# Patient Record
Sex: FEMALE | Race: WHITE | Employment: FULL TIME | URBAN - METROPOLITAN AREA
[De-identification: names, ages, dates, MRNs, and addresses within clinical notes are randomized per-mention and may not be internally consistent; named-entity substitution may affect disease eponyms.]

---

## 2017-01-06 PROCEDURE — 87591 N.GONORRHOEAE DNA AMP PROB: CPT | Performed by: OBSTETRICS & GYNECOLOGY

## 2017-01-06 PROCEDURE — 87491 CHLMYD TRACH DNA AMP PROBE: CPT | Performed by: OBSTETRICS & GYNECOLOGY

## 2017-01-06 PROCEDURE — 88175 CYTOPATH C/V AUTO FLUID REDO: CPT | Performed by: OBSTETRICS & GYNECOLOGY

## 2017-12-06 ENCOUNTER — HOSPITAL ENCOUNTER (EMERGENCY)
Age: 22
Discharge: HOME | End: 2017-12-06
Attending: EMERGENCY MEDICINE
Payer: COMMERCIAL

## 2017-12-06 VITALS
TEMPERATURE: 98 F | RESPIRATION RATE: 18 BRPM | OXYGEN SATURATION: 99 % | DIASTOLIC BLOOD PRESSURE: 51 MMHG | HEIGHT: 61 IN | SYSTOLIC BLOOD PRESSURE: 100 MMHG | HEART RATE: 88 BPM | WEIGHT: 110 LBS | BODY MASS INDEX: 20.77 KG/M2

## 2017-12-06 DIAGNOSIS — E86.0 DEHYDRATION: ICD-10-CM

## 2017-12-06 DIAGNOSIS — R11.2 SEVERE NAUSEA AND VOMITING: Primary | ICD-10-CM

## 2017-12-06 PROCEDURE — 96374 THER/PROPH/DIAG INJ IV PUSH: CPT

## 2017-12-06 PROCEDURE — 99284 EMERGENCY DEPT VISIT MOD MDM: CPT

## 2017-12-06 PROCEDURE — 85025 COMPLETE CBC W/AUTO DIFF WBC: CPT | Performed by: EMERGENCY MEDICINE

## 2017-12-06 PROCEDURE — 96375 TX/PRO/DX INJ NEW DRUG ADDON: CPT

## 2017-12-06 PROCEDURE — 81025 URINE PREGNANCY TEST: CPT

## 2017-12-06 PROCEDURE — 96361 HYDRATE IV INFUSION ADD-ON: CPT

## 2017-12-06 PROCEDURE — 83690 ASSAY OF LIPASE: CPT | Performed by: EMERGENCY MEDICINE

## 2017-12-06 PROCEDURE — 81003 URINALYSIS AUTO W/O SCOPE: CPT | Performed by: EMERGENCY MEDICINE

## 2017-12-06 PROCEDURE — 80053 COMPREHEN METABOLIC PANEL: CPT | Performed by: EMERGENCY MEDICINE

## 2017-12-06 RX ORDER — MORPHINE SULFATE 2 MG/ML
2 INJECTION, SOLUTION INTRAMUSCULAR; INTRAVENOUS ONCE
Status: COMPLETED | OUTPATIENT
Start: 2017-12-06 | End: 2017-12-06

## 2017-12-06 RX ORDER — ONDANSETRON 8 MG/1
8 TABLET, ORALLY DISINTEGRATING ORAL EVERY 8 HOURS PRN
Qty: 10 TABLET | Refills: 0 | Status: SHIPPED | OUTPATIENT
Start: 2017-12-06 | End: 2017-12-13

## 2017-12-06 RX ORDER — ONDANSETRON 2 MG/ML
4 INJECTION INTRAMUSCULAR; INTRAVENOUS ONCE
Status: COMPLETED | OUTPATIENT
Start: 2017-12-06 | End: 2017-12-06

## 2017-12-07 NOTE — ED PROVIDER NOTES
Patient Seen in: [facility] In [place]    History   Patient presents with:  Abdomen/Flank Pain (GI/)  Nausea/Vomiting/Diarrhea (gastrointestinal)    Stated Complaint: ABD PAIN/VOMITING    HPI    After eating fast food at about 8 PM URINALYSIS WITH CULTURE REFLEX - Abnormal; Notable for the following:     Ketones Urine Trace (*)     All other components within normal limits   CBC W/ DIFFERENTIAL - Abnormal; Notable for the following:     WBC 13.6 (*)     Neutrophil Absolute Prelim 1

## 2019-02-15 PROCEDURE — 86003 ALLG SPEC IGE CRUDE XTRC EA: CPT | Performed by: FAMILY MEDICINE

## 2019-02-15 PROCEDURE — 82785 ASSAY OF IGE: CPT | Performed by: FAMILY MEDICINE

## 2019-02-15 PROCEDURE — 36415 COLL VENOUS BLD VENIPUNCTURE: CPT | Performed by: FAMILY MEDICINE

## 2019-12-30 ENCOUNTER — HOSPITAL ENCOUNTER (EMERGENCY)
Facility: HOSPITAL | Age: 24
Discharge: LEFT BEFORE DISCHARGE | End: 2019-12-30
Payer: COMMERCIAL

## 2019-12-30 ENCOUNTER — HOSPITAL ENCOUNTER (EMERGENCY)
Age: 24
Discharge: HOME | End: 2019-12-31
Attending: EMERGENCY MEDICINE
Payer: COMMERCIAL

## 2019-12-30 ENCOUNTER — APPOINTMENT (OUTPATIENT)
Dept: GENERAL RADIOLOGY | Age: 24
End: 2019-12-30
Attending: FAMILY MEDICINE
Payer: COMMERCIAL

## 2019-12-30 ENCOUNTER — HOSPITAL ENCOUNTER (OUTPATIENT)
Age: 24
Discharge: ANOTHER HOSPITAL | End: 2019-12-30
Attending: FAMILY MEDICINE
Payer: COMMERCIAL

## 2019-12-30 VITALS
OXYGEN SATURATION: 98 % | RESPIRATION RATE: 20 BRPM | WEIGHT: 114 LBS | DIASTOLIC BLOOD PRESSURE: 72 MMHG | TEMPERATURE: 99 F | HEART RATE: 88 BPM | BODY MASS INDEX: 21.52 KG/M2 | SYSTOLIC BLOOD PRESSURE: 110 MMHG | HEIGHT: 61 IN

## 2019-12-30 VITALS
SYSTOLIC BLOOD PRESSURE: 117 MMHG | HEART RATE: 89 BPM | OXYGEN SATURATION: 100 % | DIASTOLIC BLOOD PRESSURE: 79 MMHG | TEMPERATURE: 97 F | RESPIRATION RATE: 16 BRPM

## 2019-12-30 DIAGNOSIS — J10.1 INFLUENZA A: Primary | ICD-10-CM

## 2019-12-30 DIAGNOSIS — D72.819 LEUKOPENIA, UNSPECIFIED TYPE: ICD-10-CM

## 2019-12-30 DIAGNOSIS — E87.6 HYPOKALEMIA: ICD-10-CM

## 2019-12-30 DIAGNOSIS — R11.2 NAUSEA AND VOMITING, INTRACTABILITY OF VOMITING NOT SPECIFIED, UNSPECIFIED VOMITING TYPE: ICD-10-CM

## 2019-12-30 DIAGNOSIS — J11.1 INFLUENZA: Primary | ICD-10-CM

## 2019-12-30 DIAGNOSIS — R10.13 ACUTE EPIGASTRIC PAIN: ICD-10-CM

## 2019-12-30 DIAGNOSIS — D69.6 THROMBOCYTOPENIA (HCC): ICD-10-CM

## 2019-12-30 LAB
#MXD IC: 0.3 X10ˆ3/UL (ref 0.1–1)
ALBUMIN SERPL-MCNC: 2.7 G/DL (ref 3.4–5)
ALBUMIN/GLOB SERPL: 0.8 {RATIO} (ref 1–2)
ALP LIVER SERPL-CCNC: 30 U/L (ref 37–98)
ALT SERPL-CCNC: 26 U/L (ref 13–56)
ANION GAP SERPL CALC-SCNC: 8 MMOL/L (ref 0–18)
AST SERPL-CCNC: 40 U/L (ref 15–37)
BASOPHILS # BLD AUTO: 0.01 X10(3) UL (ref 0–0.2)
BASOPHILS NFR BLD AUTO: 0.4 %
BILIRUB SERPL-MCNC: 0.3 MG/DL (ref 0.1–2)
BUN BLD-MCNC: 9 MG/DL (ref 7–18)
BUN/CREAT SERPL: 15.3 (ref 10–20)
CALCIUM BLD-MCNC: 7.5 MG/DL (ref 8.5–10.1)
CHLORIDE SERPL-SCNC: 111 MMOL/L (ref 98–112)
CO2 SERPL-SCNC: 21 MMOL/L (ref 21–32)
CREAT BLD-MCNC: 0.59 MG/DL (ref 0.55–1.02)
CREAT BLD-MCNC: 0.6 MG/DL (ref 0.55–1.02)
DEPRECATED RDW RBC AUTO: 43.5 FL (ref 35.1–46.3)
EOSINOPHIL # BLD AUTO: 0.01 X10(3) UL (ref 0–0.7)
EOSINOPHIL NFR BLD AUTO: 0.4 %
ERYTHROCYTE [DISTWIDTH] IN BLOOD BY AUTOMATED COUNT: 13.2 % (ref 11–15)
GLOBULIN PLAS-MCNC: 3.3 G/DL (ref 2.8–4.4)
GLUCOSE BLD-MCNC: 73 MG/DL (ref 70–99)
GLUCOSE BLD-MCNC: 77 MG/DL (ref 70–99)
HCT VFR BLD AUTO: 34.8 % (ref 35–48)
HCT VFR BLD AUTO: 40.6 % (ref 35–48)
HGB BLD-MCNC: 11.5 G/DL (ref 12–16)
HGB BLD-MCNC: 13.4 G/DL (ref 12–16)
IMM GRANULOCYTES # BLD AUTO: 0.01 X10(3) UL (ref 0–1)
IMM GRANULOCYTES NFR BLD: 0.4 %
ISTAT BUN: 8 MG/DL (ref 8–20)
ISTAT CHLORIDE: 103 MMOL/L (ref 101–111)
ISTAT HEMATOCRIT: 40 % (ref 34–50)
ISTAT IONIZED CALCIUM FOR CHEM 8: 1.17 MMOL/L (ref 1.12–1.32)
ISTAT POTASSIUM: 3.1 MMOL/L (ref 3.6–5.1)
ISTAT SODIUM: 138 MMOL/L (ref 136–145)
LIPASE SERPL-CCNC: 288 U/L (ref 73–393)
LYMPHOCYTES # BLD AUTO: 0.55 X10(3) UL (ref 1–4)
LYMPHOCYTES # BLD AUTO: 0.8 X10ˆ3/UL (ref 1–4)
LYMPHOCYTES NFR BLD AUTO: 23.4 %
LYMPHOCYTES NFR BLD AUTO: 27.1 %
M PROTEIN MFR SERPL ELPH: 6 G/DL (ref 6.4–8.2)
MCH RBC QN AUTO: 29.4 PG (ref 26–34)
MCH RBC QN AUTO: 29.6 PG (ref 26–34)
MCHC RBC AUTO-ENTMCNC: 33 G/DL (ref 31–37)
MCHC RBC AUTO-ENTMCNC: 33 G/DL (ref 31–37)
MCV RBC AUTO: 89 FL (ref 80–100)
MCV RBC AUTO: 89.5 FL (ref 80–100)
MIXED CELL %: 12 %
MONOCYTES # BLD AUTO: 0.32 X10(3) UL (ref 0.1–1)
MONOCYTES NFR BLD AUTO: 13.6 %
NEUTROPHILS # BLD AUTO: 1.45 X10 (3) UL (ref 1.5–7.7)
NEUTROPHILS # BLD AUTO: 1.45 X10(3) UL (ref 1.5–7.7)
NEUTROPHILS # BLD AUTO: 1.7 X10ˆ3/UL (ref 1.5–7.7)
NEUTROPHILS NFR BLD AUTO: 60.9 %
NEUTROPHILS NFR BLD AUTO: 61.8 %
OSMOLALITY SERPL CALC.SUM OF ELEC: 287 MOSM/KG (ref 275–295)
PLATELET # BLD AUTO: 128 10(3)UL (ref 150–450)
PLATELET # BLD AUTO: 169 X10ˆ3/UL (ref 150–450)
POCT BILIRUBIN URINE: NEGATIVE
POCT BLOOD URINE: NEGATIVE
POCT GLUCOSE URINE: NEGATIVE MG/DL
POCT INFLUENZA A: POSITIVE
POCT INFLUENZA B: NEGATIVE
POCT KETONE URINE: NEGATIVE MG/DL
POCT LEUKOCYTE ESTERASE URINE: NEGATIVE
POCT NITRITE URINE: NEGATIVE
POCT PH URINE: 7 (ref 5–8)
POCT PROTEIN URINE: NEGATIVE MG/DL
POCT SPECIFIC GRAVITY URINE: 1.02
POCT URINE COLOR: YELLOW
POCT URINE PREGNANCY: NEGATIVE
POCT UROBILINOGEN URINE: 0.2 MG/DL
POTASSIUM SERPL-SCNC: 4.6 MMOL/L (ref 3.5–5.1)
RBC # BLD AUTO: 3.89 X10(6)UL (ref 3.8–5.3)
RBC # BLD AUTO: 4.56 X10ˆ6/UL (ref 3.8–5.3)
SODIUM SERPL-SCNC: 140 MMOL/L (ref 136–145)
WBC # BLD AUTO: 2.4 X10(3) UL (ref 4–11)
WBC # BLD AUTO: 2.8 X10ˆ3/UL (ref 4–11)

## 2019-12-30 PROCEDURE — 99284 EMERGENCY DEPT VISIT MOD MDM: CPT | Performed by: EMERGENCY MEDICINE

## 2019-12-30 PROCEDURE — 81025 URINE PREGNANCY TEST: CPT | Performed by: FAMILY MEDICINE

## 2019-12-30 PROCEDURE — 71046 X-RAY EXAM CHEST 2 VIEWS: CPT | Performed by: FAMILY MEDICINE

## 2019-12-30 PROCEDURE — 96361 HYDRATE IV INFUSION ADD-ON: CPT

## 2019-12-30 PROCEDURE — 99204 OFFICE O/P NEW MOD 45 MIN: CPT

## 2019-12-30 PROCEDURE — 96374 THER/PROPH/DIAG INJ IV PUSH: CPT | Performed by: EMERGENCY MEDICINE

## 2019-12-30 PROCEDURE — 96361 HYDRATE IV INFUSION ADD-ON: CPT | Performed by: EMERGENCY MEDICINE

## 2019-12-30 PROCEDURE — 85025 COMPLETE CBC W/AUTO DIFF WBC: CPT | Performed by: FAMILY MEDICINE

## 2019-12-30 PROCEDURE — 80047 BASIC METABLC PNL IONIZED CA: CPT

## 2019-12-30 PROCEDURE — 96376 TX/PRO/DX INJ SAME DRUG ADON: CPT

## 2019-12-30 PROCEDURE — 83690 ASSAY OF LIPASE: CPT | Performed by: EMERGENCY MEDICINE

## 2019-12-30 PROCEDURE — 81025 URINE PREGNANCY TEST: CPT

## 2019-12-30 PROCEDURE — 87502 INFLUENZA DNA AMP PROBE: CPT | Performed by: FAMILY MEDICINE

## 2019-12-30 PROCEDURE — 80053 COMPREHEN METABOLIC PANEL: CPT | Performed by: EMERGENCY MEDICINE

## 2019-12-30 PROCEDURE — 99215 OFFICE O/P EST HI 40 MIN: CPT

## 2019-12-30 PROCEDURE — S0028 INJECTION, FAMOTIDINE, 20 MG: HCPCS

## 2019-12-30 PROCEDURE — 81002 URINALYSIS NONAUTO W/O SCOPE: CPT | Performed by: FAMILY MEDICINE

## 2019-12-30 PROCEDURE — 96375 TX/PRO/DX INJ NEW DRUG ADDON: CPT

## 2019-12-30 PROCEDURE — 81002 URINALYSIS NONAUTO W/O SCOPE: CPT

## 2019-12-30 PROCEDURE — 96374 THER/PROPH/DIAG INJ IV PUSH: CPT

## 2019-12-30 PROCEDURE — 96375 TX/PRO/DX INJ NEW DRUG ADDON: CPT | Performed by: EMERGENCY MEDICINE

## 2019-12-30 PROCEDURE — S0028 INJECTION, FAMOTIDINE, 20 MG: HCPCS | Performed by: EMERGENCY MEDICINE

## 2019-12-30 RX ORDER — DIPHENHYDRAMINE HYDROCHLORIDE 50 MG/ML
25 INJECTION INTRAMUSCULAR; INTRAVENOUS ONCE
Status: COMPLETED | OUTPATIENT
Start: 2019-12-30 | End: 2019-12-30

## 2019-12-30 RX ORDER — ONDANSETRON 2 MG/ML
4 INJECTION INTRAMUSCULAR; INTRAVENOUS ONCE
Status: COMPLETED | OUTPATIENT
Start: 2019-12-30 | End: 2019-12-30

## 2019-12-30 RX ORDER — FAMOTIDINE 10 MG/ML
20 INJECTION, SOLUTION INTRAVENOUS ONCE
Status: COMPLETED | OUTPATIENT
Start: 2019-12-30 | End: 2019-12-30

## 2019-12-30 RX ORDER — POTASSIUM CHLORIDE 20 MEQ/1
20 TABLET, EXTENDED RELEASE ORAL DAILY
Qty: 2 TABLET | Refills: 0 | Status: SHIPPED | OUTPATIENT
Start: 2019-12-30 | End: 2020-01-01

## 2019-12-30 RX ORDER — METOCLOPRAMIDE HYDROCHLORIDE 5 MG/ML
5 INJECTION INTRAMUSCULAR; INTRAVENOUS ONCE
Status: COMPLETED | OUTPATIENT
Start: 2019-12-30 | End: 2019-12-30

## 2019-12-30 RX ORDER — POTASSIUM CHLORIDE 20 MEQ/1
20 TABLET, EXTENDED RELEASE ORAL ONCE
Status: COMPLETED | OUTPATIENT
Start: 2019-12-30 | End: 2019-12-30

## 2019-12-30 RX ORDER — SODIUM CHLORIDE 9 MG/ML
1000 INJECTION, SOLUTION INTRAVENOUS ONCE
Status: COMPLETED | OUTPATIENT
Start: 2019-12-30 | End: 2019-12-30

## 2019-12-30 RX ORDER — ONDANSETRON 4 MG/1
4 TABLET, ORALLY DISINTEGRATING ORAL EVERY 8 HOURS PRN
Qty: 21 TABLET | Refills: 0 | Status: SHIPPED | OUTPATIENT
Start: 2019-12-30 | End: 2020-01-06

## 2019-12-31 VITALS
TEMPERATURE: 97 F | DIASTOLIC BLOOD PRESSURE: 66 MMHG | BODY MASS INDEX: 22 KG/M2 | OXYGEN SATURATION: 100 % | SYSTOLIC BLOOD PRESSURE: 111 MMHG | HEART RATE: 75 BPM | WEIGHT: 114 LBS | RESPIRATION RATE: 16 BRPM

## 2019-12-31 RX ORDER — ONDANSETRON 4 MG/1
4 TABLET, ORALLY DISINTEGRATING ORAL EVERY 4 HOURS PRN
Qty: 10 TABLET | Refills: 0 | Status: SHIPPED | OUTPATIENT
Start: 2019-12-31 | End: 2020-11-16

## 2019-12-31 NOTE — ED PROVIDER NOTES
Patient Seen in: [facility] In [place]      History   Patient presents with:  Nausea/Vomiting/Diarrhea    Stated Complaint: VOMITING, SENT FROM [facility] FOR ABD US.  +FLU    HPI    This is a 26-year-old female that presents with vomiting, c Lungs: Clear to auscultation bilaterally with no rales, no retractions, and no wheezing. HEART:  Regular rate and rhythm. S1 and S2. No murmurs, no rubs or gallops. ABDOMEN: Soft, nontender and nondistended. Normoactive bowel sounds. No rebound.  No gu rechecked in a couple of days by her primary care physician. Return anytime if she feels worse. In addition recommend she stop taking clindamycin. Patient was discharged home in good condition with her mother.           Disposition and Plan     [facility]

## 2019-12-31 NOTE — ED INITIAL ASSESSMENT (HPI)
Pt here w/ fever 102 at highest Friday and Saturday, HA, abd/gastric pain, nausea and vomiting, body aches.

## 2019-12-31 NOTE — ED PROVIDER NOTES
Patient Seen in: [facility] In [facility]      History   Patient presents with:  Fever  Abdomen/Flank Pain  Nausea/Vomiting/Diarrhea    Stated Complaint: FEVER    HPI    21-year-old female with chief complaints of cough, epigastric abdominal auscultation bilaterally  Heart S1-S2 heard no murmurs no gallops  Skin shows no rash  Abdomen is soft, minimal epigastric tenderness otherwise rest of the abdomen is soft nontender no guarding no rigidity. No CVA tenderness.     ED Course     Labs Reviewe pm    Follow-up:  [address]   [phone]  Go today          Medications Prescribed:  Discharge Medication List as of 12/30/2019  9:14 PM    START taking these medications    Potassium Chloride ER 2

## 2019-12-31 NOTE — ED INITIAL ASSESSMENT (HPI)
Cough, generalized weakness. Fever -resolved. Vomiting onset Thursday . Sent from [facility] +flu .  Sent to ED for US abd.

## 2019-12-31 NOTE — ED INITIAL ASSESSMENT (HPI)
Pt to ED from [facility] for continued nausea and vomiting. Pt diagnosed with flu, given fluids, zofran and pepcid and immediate care but pt felt no improvement of symptoms and continued to vomit.

## 2020-10-10 ENCOUNTER — APPOINTMENT (OUTPATIENT)
Dept: GENERAL RADIOLOGY | Age: 25
End: 2020-10-10
Attending: NURSE PRACTITIONER
Payer: COMMERCIAL

## 2020-10-10 ENCOUNTER — HOSPITAL ENCOUNTER (OUTPATIENT)
Age: 25
Discharge: HOME | End: 2020-10-10
Payer: COMMERCIAL

## 2020-10-10 ENCOUNTER — TELEPHONE (OUTPATIENT)
Dept: FAMILY MEDICINE CLINIC | Facility: CLINIC | Age: 25
End: 2020-10-10

## 2020-10-10 VITALS
BODY MASS INDEX: 22.66 KG/M2 | HEIGHT: 61 IN | DIASTOLIC BLOOD PRESSURE: 77 MMHG | TEMPERATURE: 99 F | WEIGHT: 120 LBS | HEART RATE: 72 BPM | SYSTOLIC BLOOD PRESSURE: 119 MMHG | RESPIRATION RATE: 18 BRPM | OXYGEN SATURATION: 100 %

## 2020-10-10 DIAGNOSIS — M54.9 MUSCULOSKELETAL BACK PAIN: Primary | ICD-10-CM

## 2020-10-10 DIAGNOSIS — Z02.9 ENCOUNTERS FOR ADMINISTRATIVE PURPOSE: Primary | ICD-10-CM

## 2020-10-10 PROCEDURE — 36415 COLL VENOUS BLD VENIPUNCTURE: CPT

## 2020-10-10 PROCEDURE — 99214 OFFICE O/P EST MOD 30 MIN: CPT

## 2020-10-10 PROCEDURE — 71046 X-RAY EXAM CHEST 2 VIEWS: CPT | Performed by: NURSE PRACTITIONER

## 2020-10-10 PROCEDURE — 85378 FIBRIN DEGRADE SEMIQUANT: CPT | Performed by: NURSE PRACTITIONER

## 2020-10-10 PROCEDURE — 81025 URINE PREGNANCY TEST: CPT | Performed by: NURSE PRACTITIONER

## 2020-10-10 RX ORDER — CYCLOBENZAPRINE HCL 10 MG
10 TABLET ORAL 3 TIMES DAILY PRN
Qty: 20 TABLET | Refills: 0 | Status: SHIPPED | OUTPATIENT
Start: 2020-10-10 | End: 2020-10-17

## 2020-10-10 NOTE — ED PROVIDER NOTES
Patient Seen in: [facility] In [place] [employer]      History   Patient presents with:  Back Pain: My middle right back hurts when I breathe in. This has been happening since Tuesday, October 6th.  - Entered by patient    Stated Complaint: Back Pain ED Triage Vitals [10/10/20 1111]   /77   Pulse 72   Resp 18   Temp 98.8 °F (37.1 °C)   Temp src Temporal   SpO2 100 %   O2 Device None (Room air)       Current:/77   Pulse 72   Temp 98.8 °F (37.1 °C) (Temporal)   Resp 18   Ht 154.9 cm (5' 1\") PROCEDURE:  XR CHEST PA + LAT CHEST (CPT=71046)  INDICATIONS:  Back Pain - My middle right back hurts when I breathe in. This has been happening since Tuesday, October 6th.   COMPARISON:  JOHANNY QUARLES, XR CHEST PA + LAT CHEST (CPT=71046), 12/30/2019, 7:1

## 2020-10-10 NOTE — TELEPHONE ENCOUNTER
Pt pre registered for [facility] visit this afternoon. CC back pain reproduced with cough and deep breathing. Pt denies CP or SOB. Referred to facility with ability to evaluate patient with imaging if it is indicated.  Pt will plan to seek care at [facility] later tod

## 2020-10-10 NOTE — ED INITIAL ASSESSMENT (HPI)
Pt began 5 days ago with upper rt back pain when she takes a deep breath.   Is a runner, denies any injury , no SOB

## 2020-11-16 ENCOUNTER — OFFICE VISIT (OUTPATIENT)
Dept: INTERNAL MEDICINE CLINIC | Facility: CLINIC | Age: 25
End: 2020-11-16

## 2020-11-16 VITALS
RESPIRATION RATE: 16 BRPM | WEIGHT: 124 LBS | HEIGHT: 61 IN | SYSTOLIC BLOOD PRESSURE: 114 MMHG | OXYGEN SATURATION: 99 % | HEART RATE: 92 BPM | BODY MASS INDEX: 23.41 KG/M2 | DIASTOLIC BLOOD PRESSURE: 70 MMHG | TEMPERATURE: 98 F

## 2020-11-16 DIAGNOSIS — Z23 NEED FOR INFLUENZA VACCINATION: Primary | ICD-10-CM

## 2020-11-16 DIAGNOSIS — Z01.89 ENCOUNTER FOR ROUTINE LABORATORY TESTING: ICD-10-CM

## 2020-11-16 DIAGNOSIS — G44.209 TENSION HEADACHE: ICD-10-CM

## 2020-11-16 DIAGNOSIS — Z00.00 ROUTINE PHYSICAL EXAMINATION: ICD-10-CM

## 2020-11-16 DIAGNOSIS — Z11.3 ROUTINE SCREENING FOR STI (SEXUALLY TRANSMITTED INFECTION): ICD-10-CM

## 2020-11-16 DIAGNOSIS — Z30.41 ORAL CONTRACEPTIVE PILL SURVEILLANCE: ICD-10-CM

## 2020-11-16 PROCEDURE — 3074F SYST BP LT 130 MM HG: CPT | Performed by: PHYSICIAN ASSISTANT

## 2020-11-16 PROCEDURE — 3078F DIAST BP <80 MM HG: CPT | Performed by: PHYSICIAN ASSISTANT

## 2020-11-16 PROCEDURE — 3008F BODY MASS INDEX DOCD: CPT | Performed by: PHYSICIAN ASSISTANT

## 2020-11-16 PROCEDURE — 90471 IMMUNIZATION ADMIN: CPT | Performed by: PHYSICIAN ASSISTANT

## 2020-11-16 PROCEDURE — 99385 PREV VISIT NEW AGE 18-39: CPT | Performed by: PHYSICIAN ASSISTANT

## 2020-11-16 PROCEDURE — 90686 IIV4 VACC NO PRSV 0.5 ML IM: CPT | Performed by: PHYSICIAN ASSISTANT

## 2020-11-16 RX ORDER — NORETHINDRONE ACETATE AND ETHINYL ESTRADIOL 1MG-20(21)
1 KIT ORAL DAILY
Qty: 3 PACKAGE | Refills: 3 | Status: SHIPPED | OUTPATIENT
Start: 2020-11-16 | End: 2021-11-02

## 2020-11-16 NOTE — PATIENT INSTRUCTIONS
Continue healthy lifestyle  Have labs drawn, fasting 8-10 hours prior (water before is ok). Call or send us a message if headaches continue or if any new symptoms arise. Otherwise drink plenty of water and take advil or tylenol if needed.

## 2020-11-16 NOTE — PROGRESS NOTES
Wellness Exam    CC: Patient is presenting for a wellness exam    HPI:   Concerns: bl frontal headache last week, better now. Was throbbing, dull, non-radiating. Sore throat for a day then resolved. Feels well today.      Pertinent Family History:   Family vomiting, abdominal pain and diarrhea. Genitourinary: Negative for urgency, frequency of urination, and abnormal vaginal bleeding. Musculoskeletal: Negative for arthralgias and gait problem. Skin: Negative for color change and rash.    Neurological: N follows:     Headache seems c/w tension-type, possibly related to virus or allergies last week, symptoms resolved now.  Pt will call if HA's return or any new symptoms    Check screening labs  Continue healthy lifestyle  Flu shot done      Discussed use of

## 2021-11-02 DIAGNOSIS — Z30.41 ORAL CONTRACEPTIVE PILL SURVEILLANCE: ICD-10-CM

## 2021-11-02 RX ORDER — NORETHINDRONE ACETATE AND ETHINYL ESTRADIOL AND FERROUS FUMARATE 1MG-20(21)
KIT ORAL
Qty: 84 TABLET | Refills: 3 | Status: SHIPPED | OUTPATIENT
Start: 2021-11-02

## 2022-10-18 DIAGNOSIS — Z30.41 ORAL CONTRACEPTIVE PILL SURVEILLANCE: ICD-10-CM

## 2022-10-18 NOTE — TELEPHONE ENCOUNTER
Last time medication was refilled 11/02/21  Quantity and # of refills 84/3  Last OV 11/16/20  Next OV No apt scheduled    Pt to RTC for physical around 11/16/21    Called pt and left detailed VM per protocol. Pt last seen in the office in 2020. No apt has been scheduled. Pt will need to schedule physical in order to obtain future refills. Ad[facility]

## 2022-10-19 RX ORDER — NORETHINDRONE ACETATE AND ETHINYL ESTRADIOL AND FERROUS FUMARATE 1MG-20(21)
KIT ORAL
Qty: 84 TABLET | Refills: 0 | Status: SHIPPED | OUTPATIENT
Start: 2022-10-19

## 2022-10-19 NOTE — TELEPHONE ENCOUNTER
Called patient. Patient states insurance changed and now has [address]. Advised patient due for OV. Patient verbalized ok with scheduling. Physical scheduled. Per patient she can only attend an appointment after 4:30.      Future Appointments   Date Time Provider Henry Guthrie   10/26/2022  5:00 PM MIRE[facility]A [facility] [address] [facility] [address]

## 2023-01-26 DIAGNOSIS — Z30.41 ORAL CONTRACEPTIVE PILL SURVEILLANCE: ICD-10-CM

## 2023-01-26 RX ORDER — NORETHINDRONE ACETATE AND ETHINYL ESTRADIOL AND FERROUS FUMARATE 1MG-20(21)
KIT ORAL
Qty: 28 TABLET | Refills: 0 | Status: SHIPPED | OUTPATIENT
Start: 2023-01-26

## 2023-02-22 DIAGNOSIS — Z30.41 ORAL CONTRACEPTIVE PILL SURVEILLANCE: ICD-10-CM

## 2023-02-22 NOTE — TELEPHONE ENCOUNTER
JUNEL FE 1/20 1-20 MG-MCG Oral Tab  Last time medication was refilled 1/26/23  Quantity and # of refills 28 tab  Last OV 11/16/2020  Next OV LVM notifying patient to schedule OV prior to refills moving forward.

## 2023-02-24 DIAGNOSIS — Z30.41 ORAL CONTRACEPTIVE PILL SURVEILLANCE: ICD-10-CM

## 2023-02-24 RX ORDER — NORETHINDRONE ACETATE AND ETHINYL ESTRADIOL 1MG-20(21)
1 KIT ORAL DAILY
Qty: 28 TABLET | Refills: 0 | Status: CANCELLED | OUTPATIENT
Start: 2023-02-24

## 2023-02-24 RX ORDER — NORETHINDRONE ACETATE AND ETHINYL ESTRADIOL AND FERROUS FUMARATE 1MG-20(21)
KIT ORAL
Qty: 28 TABLET | Refills: 0 | OUTPATIENT
Start: 2023-02-24

## 2024-01-23 ENCOUNTER — OFFICE VISIT (OUTPATIENT)
Facility: CLINIC | Age: 29
End: 2024-01-23
Payer: COMMERCIAL

## 2024-01-23 VITALS
DIASTOLIC BLOOD PRESSURE: 70 MMHG | WEIGHT: 118.63 LBS | HEIGHT: 62 IN | SYSTOLIC BLOOD PRESSURE: 108 MMHG | HEART RATE: 92 BPM | BODY MASS INDEX: 21.83 KG/M2

## 2024-01-23 DIAGNOSIS — Z12.4 CERVICAL CANCER SCREENING: ICD-10-CM

## 2024-01-23 DIAGNOSIS — Z30.011 INITIATION OF ORAL CONTRACEPTION: ICD-10-CM

## 2024-01-23 DIAGNOSIS — Z01.419 WELL WOMAN EXAM WITH ROUTINE GYNECOLOGICAL EXAM: Primary | ICD-10-CM

## 2024-01-23 PROCEDURE — 99385 PREV VISIT NEW AGE 18-39: CPT

## 2024-01-23 PROCEDURE — 87591 N.GONORRHOEAE DNA AMP PROB: CPT

## 2024-01-23 PROCEDURE — 3078F DIAST BP <80 MM HG: CPT

## 2024-01-23 PROCEDURE — 88175 CYTOPATH C/V AUTO FLUID REDO: CPT

## 2024-01-23 PROCEDURE — 3008F BODY MASS INDEX DOCD: CPT

## 2024-01-23 PROCEDURE — 87491 CHLMYD TRACH DNA AMP PROBE: CPT

## 2024-01-23 PROCEDURE — 3074F SYST BP LT 130 MM HG: CPT

## 2024-01-23 RX ORDER — NORETHINDRONE ACETATE AND ETHINYL ESTRADIOL 1MG-20(21)
1 KIT ORAL DAILY
Qty: 28 TABLET | Refills: 11 | Status: SHIPPED | OUTPATIENT
Start: 2024-01-23 | End: 2025-01-22

## 2024-01-23 NOTE — PROGRESS NOTES
Jelly Morley is a 28 year old female  Patient's last menstrual period was 2024 (exact date).   Chief Complaint   Patient presents with    Wellness Visit     Last pap 19 neg   Wants to discuss birth control    .  She received the HPV vaccine when she was younger.     OBSTETRICS HISTORY:  OB History    Para Term  AB Living   0 0 0 0 0 0   SAB IAB Ectopic Multiple Live Births   0 0 0 0         GYNE HISTORY:  Periods regular monthly    History   Sexual Activity    Sexual activity: Yes    Partners: Male     Comment: withdrawal        Hx Prior Abnormal Pap: No  Pap Date: 19  Pap Result Notes: neg        MEDICAL HISTORY:  History reviewed. No pertinent past medical history.    SURGICAL HISTORY:  History reviewed. No pertinent surgical history.    SOCIAL HISTORY:  Social History     Socioeconomic History    Marital status: Single     Spouse name: Not on file    Number of children: Not on file    Years of education: Not on file    Highest education level: Not on file   Occupational History    Not on file   Tobacco Use    Smoking status: Never    Smokeless tobacco: Never   Vaping Use    Vaping Use: Some days    Substances: THC   Substance and Sexual Activity    Alcohol use: Yes     Comment: socially    Drug use: Yes     Frequency: 1.0 times per week     Types: Cannabis    Sexual activity: Yes     Partners: Male     Comment: withdrawal   Other Topics Concern    Not on file   Social History Narrative    Not on file     Social Determinants of Health     Financial Resource Strain: Not on file   Food Insecurity: Not on file   Transportation Needs: Not on file   Physical Activity: Not on file   Stress: Not on file   Social Connections: Not on file   Housing Stability: Not on file       FAMILY HISTORY:  Family History   Problem Relation Age of Onset    Diabetes Father         Type II    Migraines Maternal Grandmother     Diabetes Maternal Grandmother         Type II    Psychiatric Maternal  Grandmother         Bipolar    Other (Lung Cancer [Other]) Maternal Grandfather         smoker    Hypertension Paternal Grandmother     Other (Seasonal Allergies [Other]) Brother     Hypertension Father     Breast Cancer Other         MGGM    Hypertension Maternal Grandmother     Stroke Maternal Grandmother        MEDICATIONS:    Current Outpatient Medications:     Norethin Ace-Eth Estrad-FE (JUNEL FE 1/20) 1-20 MG-MCG Oral Tab, Take 1 tablet by mouth daily., Disp: 28 tablet, Rfl: 11    ALLERGIES:  No Known Allergies      Review of Systems:  Constitutional:  Denies fatigue, night sweats, hot flashes  Eyes:  denies blurred or double vision  Cardiovascular:  denies chest pain or palpitations  Respiratory:  denies shortness of breath  Gastrointestinal:  denies heartburn, abdominal pain, diarrhea or constipation  Genitourinary:  denies dysuria, incontinence, abnormal vaginal discharge, vaginal itching  Musculoskeletal:  denies back pain.  Skin/Breast:  Denies any breast pain, lumps, or discharge.   Neurological:  denies headaches, extremity weakness or numbness.  Psychiatric: denies depression or anxiety.  Endocrine:   denies excessive thirst or urination.  Heme/Lymph:  denies history of anemia, easy bruising or bleeding.      PHYSICAL EXAM:   Constitutional: well developed, well nourished  Head/Face: normocephalic  Neck/Thyroid: thyroid symmetric, no thyromegaly, no nodules, no adenopathy  Lymphatic:no abnormal supraclavicular or axillary adenopathy is noted  Breast: normal without palpable masses, tenderness, asymmetry, nipple discharge, nipple retraction or skin changes  Abdomen:  soft, nontender, nondistended, no masses  Skin/Hair: no unusual rashes or bruises  Extremities: no edema, no cyanosis  Psychiatric:  Oriented to time, place, person and situation. Appropriate mood and affect    Pelvic Exam:  External Genitalia: normal appearance, hair distribution, and no lesions  Urethral Meatus:  normal in size, location,  without lesions and prolapse  Bladder:  No fullness, masses or tenderness  Vagina:  Normal appearance without lesions, no abnormal discharge  Cervix:  Normal without tenderness on motion  Uterus: normal in size, contour, position, mobility, without tenderness  Adnexa: normal without masses or tenderness  Perineum: normal  Anus: no hemorroids     Assessment & Plan:  Diagnoses and all orders for this visit:    Well woman exam with routine gynecological exam    Cervical cancer screening  -     ThinPrep PAP with HPV Reflex Request B; Future  -     Chlamydia/Gc Amplification; Future    Initiation of oral contraception  -     Norethin Ace-Eth Estrad-FE (JUNEL FE 1/20) 1-20 MG-MCG Oral Tab; Take 1 tablet by mouth daily.

## 2024-01-24 LAB
C TRACH DNA SPEC QL NAA+PROBE: NEGATIVE
N GONORRHOEA DNA SPEC QL NAA+PROBE: NEGATIVE

## 2024-01-28 LAB
.: NORMAL
.: NORMAL

## 2024-04-03 ENCOUNTER — HOSPITAL ENCOUNTER (OUTPATIENT)
Age: 29
Discharge: HOME | End: 2024-04-03
Payer: COMMERCIAL

## 2024-04-03 VITALS
HEIGHT: 62 IN | HEART RATE: 85 BPM | WEIGHT: 115 LBS | SYSTOLIC BLOOD PRESSURE: 104 MMHG | BODY MASS INDEX: 21.16 KG/M2 | OXYGEN SATURATION: 98 % | DIASTOLIC BLOOD PRESSURE: 70 MMHG | RESPIRATION RATE: 18 BRPM | TEMPERATURE: 99 F

## 2024-04-03 DIAGNOSIS — R22.9 LOCAL SUPERFICIAL SWELLING: Primary | ICD-10-CM

## 2024-04-03 PROCEDURE — 99203 OFFICE O/P NEW LOW 30 MIN: CPT | Performed by: PHYSICIAN ASSISTANT

## 2024-04-03 NOTE — ED PROVIDER NOTES
Patient Seen in: [facility]      History     Chief Complaint   Patient presents with    Other     Stated Complaint: Bump lower back for 1 month    Subjective:   HPI    27 YO female presents to immediate care for evaluation of very mild swelling noticed just above the coccyx one month ago.  She denies pain, increased pain, increase in size, redness, warmth or any other associated symptoms. Denies injury/trauma.        Objective:   History reviewed. No pertinent past medical history.           History reviewed. No pertinent surgical history.             Social History     Socioeconomic History    Marital status: Single   Tobacco Use    Smoking status: Never    Smokeless tobacco: Never   Vaping Use    Vaping Use: Former    Substances: THC    Passive vaping exposure: Yes   Substance and Sexual Activity    Alcohol use: Yes     Comment: socially    Drug use: Yes     Frequency: 1.0 times per week     Types: Cannabis    Sexual activity: Yes     Partners: Male     Comment: withdrawal              Review of Systems    Positive for stated complaint: Bump lower back for 1 month  Other systems are as noted in HPI.  Constitutional and vital signs reviewed.      All other systems reviewed and negative except as noted above.    Physical Exam     ED Triage Vitals [04/03/24 0848]   /70   Pulse 85   Resp 18   Temp 98.9 °F (37.2 °C)   Temp src Temporal   SpO2 98 %   O2 Device None (Room air)       Current:/70   Pulse 85   Temp 98.9 °F (37.2 °C) (Temporal)   Resp 18   Ht 157.5 cm (5' 2\")   Wt 52.2 kg   LMP 03/13/2024 (Approximate)   SpO2 98%   BMI 21.03 kg/m²         Physical Exam  Vitals and nursing note reviewed.   Constitutional:       General: She is not in acute distress.     Appearance: Normal appearance. She is not ill-appearing, toxic-appearing or diaphoretic.   Cardiovascular:      Rate and Rhythm: Normal rate.   Pulmonary:      Effort: Pulmonary effort is normal. No respiratory  distress.   Musculoskeletal:      Lumbar back: No tenderness.   Skin:     Comments: Minimal localized swelling just superior to the coccyx. No erythema, tenderness, warmth.    Neurological:      Mental Status: She is alert and oriented to person, place, and time.   Psychiatric:         Mood and Affect: Mood normal.         Behavior: Behavior normal.         ED Course   Labs Reviewed - No data to display    MDM      This is a very well-appearing 27 YO female with very mild swelling noticed just above the coccyx one month ago.     Differential diagnosis considered but not limited to local skin irritation, cyst, pilonidal abscess    There is very minimal, localized swelling just superior to the coccyx.  No tenderness, erythema or warmth.  No signs of secondary bacterial infection necessitating intervention. She denies injury/trauma. Patient advised to monitor and return for any new or worsening symptoms. She will continue follow-up with her PCP as needed.        Medical Decision Making      Disposition and Plan     Clinical Impression:  1. Local superficial swelling         Disposition:  Discharge  4/3/2024  9:32 am    Follow-up:  Víctor Hamilton MD  [address] [place] [id number]  [phone]      As needed          Medications Prescribed:  Discharge Medication List as of 4/3/2024  9:33 AM

## 2024-04-03 NOTE — ED INITIAL ASSESSMENT (HPI)
Area above coccyx seems inflamed or different per pt.  No redness.  Not tender to touch,  No known injury.

## 2024-05-02 DIAGNOSIS — Z30.011 INITIATION OF ORAL CONTRACEPTION: ICD-10-CM

## 2024-05-02 RX ORDER — NORETHINDRONE ACETATE AND ETHINYL ESTRADIOL 1MG-20(21)
1 KIT ORAL DAILY
Qty: 28 TABLET | Refills: 11 | OUTPATIENT
Start: 2024-05-02 | End: 2025-05-02

## 2024-05-21 ENCOUNTER — OFFICE VISIT (OUTPATIENT)
Dept: INTERNAL MEDICINE CLINIC | Facility: CLINIC | Age: 29
End: 2024-05-21

## 2024-05-21 VITALS
HEIGHT: 62 IN | HEART RATE: 94 BPM | WEIGHT: 116 LBS | TEMPERATURE: 97 F | DIASTOLIC BLOOD PRESSURE: 72 MMHG | SYSTOLIC BLOOD PRESSURE: 116 MMHG | RESPIRATION RATE: 16 BRPM | BODY MASS INDEX: 21.35 KG/M2 | OXYGEN SATURATION: 98 %

## 2024-05-21 DIAGNOSIS — Z00.00 ROUTINE PHYSICAL EXAMINATION: ICD-10-CM

## 2024-05-21 DIAGNOSIS — Z00.00 LABORATORY EXAM ORDERED AS PART OF ROUTINE GENERAL MEDICAL EXAMINATION: ICD-10-CM

## 2024-05-21 DIAGNOSIS — R22.2 MASS OF SUBCUTANEOUS TISSUE OF BACK: Primary | ICD-10-CM

## 2024-05-21 PROCEDURE — 99213 OFFICE O/P EST LOW 20 MIN: CPT | Performed by: PHYSICIAN ASSISTANT

## 2024-05-21 PROCEDURE — 99395 PREV VISIT EST AGE 18-39: CPT | Performed by: PHYSICIAN ASSISTANT

## 2024-05-21 NOTE — PROGRESS NOTES
Wellness Exam    CC: Patient is presenting for a wellness exam    HPI:   Concerns: pt c/o mass on low back, present several months. Not painful no trauma. No skin changes. Unsure if it's growing or not    Diet is general, exercise: regular.    Gyne:     Health Maintenance   Topic Date Due    Pap Smear  01/23/2027         Denies pelvic pain, abnormal discharge or genital lesions.     Pertinent Family History:   Family History   Problem Relation Age of Onset    Diabetes Father         Type II    Migraines Maternal Grandmother     Diabetes Maternal Grandmother         Type II    Psychiatric Maternal Grandmother         Bipolar    Other (Lung Cancer [Other]) Maternal Grandfather         smoker    Hypertension Paternal Grandmother     Other (Seasonal Allergies [Other]) Brother     Hypertension Father     Breast Cancer Other         MGGM    Hypertension Maternal Grandmother     Stroke Maternal Grandmother       History reviewed. No pertinent past medical history.  History reviewed. No pertinent surgical history.  Social History     Socioeconomic History    Marital status: Single   Tobacco Use    Smoking status: Never    Smokeless tobacco: Never   Vaping Use    Vaping status: Former    Substances: THC    Passive vaping exposure: Yes   Substance and Sexual Activity    Alcohol use: Yes     Comment: socially    Drug use: Not Currently     Frequency: 1.0 times per week     Types: Cannabis    Sexual activity: Yes     Partners: Male     Comment: withdrawal     Current Outpatient Medications on File Prior to Visit   Medication Sig Dispense Refill    Norethin Ace-Eth Estrad-FE (JUNEL FE 1/20) 1-20 MG-MCG Oral Tab Take 1 tablet by mouth daily. 28 tablet 11     No current facility-administered medications on file prior to visit.       Review of Systems   Constitutional: Negative for fever, chills and fatigue.   HENT: Negative for hearing loss, congestion, sore throat and neck pain.    Eyes: Negative for pain and visual disturbance.    Respiratory: Negative for cough and shortness of breath.    Cardiovascular: Negative for chest pain and palpitations.   Gastrointestinal: Negative for nausea, vomiting, abdominal pain and diarrhea.   Genitourinary: Negative for urgency, frequency of urination, and abnormal vaginal bleeding.   Musculoskeletal: Negative for arthralgias and gait problem.   Skin: Negative for color change and rash.   Neurological: Negative for tremors, weakness and numbness.   Hematological: Negative for adenopathy. Does not bruise/bleed easily.   Psychiatric/Behavioral: Negative for confusion and agitation. The patient is not nervous/anxious.      /72   Pulse 94   Temp 97.3 °F (36.3 °C)   Resp 16   Ht 5' 2\" (1.575 m)   Wt 116 lb (52.6 kg)   LMP 05/05/2024 (Approximate)   SpO2 98%   BMI 21.22 kg/m²   Physical Exam   Constitutional: She is oriented to person, place, and time. She appears well-developed. No distress.    Head: Normocephalic and atraumatic.   Eyes: EOM are normal. Pupils are equal, round, and reactive to light. No scleral icterus.   ENT: TM's clear, nose normal, no oropharyngeal exudates or tonsillar hypertrophy    Neck: Normal range of motion. No thyromegaly present.   Cardiovascular: Normal rate, regular rhythm and normal heart sounds.  No murmur or friction rub heard.  Pulmonary/Chest: Effort normal and breath sounds normal bilaterally. She has no wheezes or rales.   Abdominal: Soft. Bowel sounds are normal. There is no tenderness. No HSM.  Musculoskeletal: Normal range of motion. She exhibits no edema.   Lymphadenopathy: She has no cervical, supraclavicular, or axillary adenopathy.   Neurological: She is alert and oriented to person, place, and time. DTRs are +2 and symmetric. Cranial nerves grossly intact.  Skin: Skin is warm. No rash noted. No erythema, pallor or jaundice.   Psychiatric: She has a normal mood and affect and her behavior is normal.     Assessment and Plan:  Jelly Morley is a 28 year  old female here for a wellness exam.  Age appropriate cancer screening, labs, safety, immunizations were discussed with the patient and ordered as follows:  1. Mass of subcutaneous tissue of back (Primary)  -     US BACK LOWER/ABD WALL(CPT=76705); Future; Expected date: 05/21/2024  2. Laboratory exam ordered as part of routine general medical examination  -     CBC With Differential With Platelet; Future; Expected date: 05/21/2024  -     Comp Metabolic Panel (14); Future; Expected date: 05/21/2024  -     Lipid Panel; Future; Expected date: 05/21/2024  -     TSH W Reflex To Free T4; Future; Expected date: 05/21/2024  -     Urinalysis, Routine; Future; Expected date: 05/21/2024  3. Routine physical examination       Discussed use of sunscreen, wearing seatbelt, recommend regular cardiovascular and weight bearing exercise as well as a well-rounded diet.    Return in about 1 year (around 5/21/2025) for routine physical, or sooner as needed.     Patient/Caregiver Education:  Patient/Caregiver Education: There are no barriers to learning. Medical education done.  Outcome: Patient verbalizes understanding.       Educated by: [id number]

## 2024-07-22 ENCOUNTER — HOSPITAL ENCOUNTER (OUTPATIENT)
Dept: ULTRASOUND IMAGING | Age: 29
Discharge: HOME | End: 2024-07-22
Attending: PHYSICIAN ASSISTANT
Payer: COMMERCIAL

## 2024-07-22 DIAGNOSIS — R22.2 MASS OF SUBCUTANEOUS TISSUE OF BACK: ICD-10-CM

## 2024-07-22 PROCEDURE — 76705 ECHO EXAM OF ABDOMEN: CPT | Performed by: PHYSICIAN ASSISTANT

## 2024-07-23 NOTE — PROGRESS NOTES
Written by Claribel El RN on 7/22/2024  3:01 PM CDT View Full Comments  Seen by patient Jelly Morley on 7/22/2024  3:24 PM

## 2024-12-19 DIAGNOSIS — Z30.011 INITIATION OF ORAL CONTRACEPTION: ICD-10-CM

## 2024-12-19 RX ORDER — NORETHINDRONE ACETATE AND ETHINYL ESTRADIOL AND FERROUS FUMARATE 1MG-20(21)
1 KIT ORAL DAILY
Qty: 84 TABLET | Refills: 0 | Status: SHIPPED | OUTPATIENT
Start: 2024-12-19 | End: 2025-02-06

## 2025-02-06 ENCOUNTER — OFFICE VISIT (OUTPATIENT)
Facility: CLINIC | Age: 30
End: 2025-02-06
Payer: COMMERCIAL

## 2025-02-06 VITALS
HEART RATE: 88 BPM | HEIGHT: 62 IN | DIASTOLIC BLOOD PRESSURE: 52 MMHG | SYSTOLIC BLOOD PRESSURE: 110 MMHG | WEIGHT: 113 LBS | BODY MASS INDEX: 20.8 KG/M2

## 2025-02-06 DIAGNOSIS — Z01.419 WELL WOMAN EXAM WITH ROUTINE GYNECOLOGICAL EXAM: Primary | ICD-10-CM

## 2025-02-06 PROCEDURE — 99395 PREV VISIT EST AGE 18-39: CPT

## 2025-02-06 RX ORDER — NORETHINDRONE ACETATE AND ETHINYL ESTRADIOL 1MG-20(21)
1 KIT ORAL DAILY
Qty: 84 TABLET | Refills: 3 | Status: SHIPPED | OUTPATIENT
Start: 2025-02-06

## 2025-02-07 NOTE — PROGRESS NOTES
Jelly Morley is a 29 year old female  Patient's last menstrual period was 2025 (exact date).   Chief Complaint   Patient presents with    Wellness Visit     WWE  - Reviewed Preventative/Wellness form with patient.    .  PCP: Dr. Hamilton, sees for yearly exams and annual labs   HPV: up to date   OBSTETRICS HISTORY:  OB History    Para Term  AB Living   0 0 0 0 0 0   SAB IAB Ectopic Multiple Live Births   0 0 0 0         GYNE HISTORY:  Periods regular monthly    History   Sexual Activity    Sexual activity: Yes    Partners: Male     Comment: withdrawal        Pap Date: 24  Pap Result Notes: Negative        MEDICAL HISTORY:  History reviewed. No pertinent past medical history.    SURGICAL HISTORY:  History reviewed. No pertinent surgical history.    SOCIAL HISTORY:  Social History     Socioeconomic History    Marital status: Single     Spouse name: Not on file    Number of children: Not on file    Years of education: Not on file    Highest education level: Not on file   Occupational History    Not on file   Tobacco Use    Smoking status: Never    Smokeless tobacco: Never   Vaping Use    Vaping status: Former    Substances: THC    Passive vaping exposure: Yes   Substance and Sexual Activity    Alcohol use: Yes     Comment: socially    Drug use: Not Currently     Frequency: 1.0 times per week     Types: Cannabis    Sexual activity: Yes     Partners: Male     Comment: withdrawal   Other Topics Concern    Not on file   Social History Narrative    Not on file     Social Drivers of Health     Food Insecurity: Not on file   Transportation Needs: No Transportation Needs (2024)    Received from [facility]    PRAPARE - Transportation     Lack of Transportation (Medical): No     Lack of Transportation (Non-Medical): No   Stress: Not on file   Housing Stability: Unknown (2024)    Received from [facility]    Housing Stability Vital Sign     Unable  to Pay for Housing in the Last Year: No     Number of Times Moved in the Last Year: Not on file     Homeless in the Last Year: Not on file       FAMILY HISTORY:  Family History   Problem Relation Age of Onset    Diabetes Father         Type II    Hypertension Father     Migraines Maternal Grandmother     Diabetes Maternal Grandmother         Type II    Psychiatric Maternal Grandmother         Bipolar    Hypertension Maternal Grandmother     Stroke Maternal Grandmother     Other (Lung Cancer [Other]) Maternal Grandfather         smoker    Hypertension Paternal Grandmother     Other (Seasonal Allergies [Other]) Brother        MEDICATIONS:    Current Outpatient Medications:     Norethin Ace-Eth Estrad-FE (AUROVELA FE 1/20) 1-20 MG-MCG Oral Tab, Take 1 tablet by mouth daily., Disp: 84 tablet, Rfl: 3    ALLERGIES:  Allergies[1]      Review of Systems:  Constitutional:  Denies fatigue, night sweats, hot flashes  Eyes:  denies blurred or double vision  Cardiovascular:  denies chest pain or palpitations  Respiratory:  denies shortness of breath  Gastrointestinal:  denies heartburn, abdominal pain, diarrhea or constipation  Genitourinary:  denies dysuria, incontinence, abnormal vaginal discharge, vaginal itching  Musculoskeletal:  denies back pain.  Skin/Breast:  Denies any breast pain, lumps, or discharge.   Neurological:  denies headaches, extremity weakness or numbness.  Psychiatric: denies depression or anxiety.  Endocrine:   denies excessive thirst or urination.  Heme/Lymph:  denies history of anemia, easy bruising or bleeding.      PHYSICAL EXAM:   Constitutional: well developed, well nourished  Head/Face: normocephalic  Neck/Thyroid: thyroid symmetric, no thyromegaly, no nodules, no adenopathy  Lymphatic:no abnormal supraclavicular or axillary adenopathy is noted  Breast: normal without palpable masses, tenderness, asymmetry, nipple discharge, nipple retraction or skin changes  Abdomen:  soft, nontender, nondistended,  no masses  Skin/Hair: no unusual rashes or bruises  Extremities: no edema, no cyanosis  Psychiatric:  Oriented to time, place, person and situation. Appropriate mood and affect    Pelvic Exam:  External Genitalia: normal appearance, hair distribution, and no lesions  Urethral Meatus:  normal in size, location, without lesions and prolapse  Bladder:  No fullness, masses or tenderness  Vagina:  Normal appearance without lesions, no abnormal discharge  Cervix:  Normal without tenderness on motion  Uterus: normal in size, contour, position, mobility, without tenderness  Adnexa: normal without masses or tenderness  Perineum: normal  Anus: no hemorroids     Assessment & Plan:  Diagnoses and all orders for this visit:    Well woman exam with routine gynecological exam  -     Norethin Ace-Eth Estrad-FE (AUROVELA FE 1/20) 1-20 MG-MCG Oral Tab; Take 1 tablet by mouth daily.                   [1] No Known Allergies

## (undated) NOTE — ED AVS SNAPSHOT
Meaghan Oneill   MRN: [id number]    Department:  THE [facility] in [place]   Date of Visit:  12/30/2019           Disclosure     Insurance plans vary and the physician(s) referred by the ER may not be covered by your plan.  Please contac tell this physician (or your personal doctor if your instructions are to return to your personal doctor) about any new or lasting problems. The primary care or specialist physician will see patients referred from the [facility] Emergency Department.  Jailyn Mayfield

## (undated) NOTE — LETTER
Date & Time: 12/31/2019, 12:35 AM  Patient: Yasmin Sports Peyman  Encounter Provider(s):    Ynes Bennett DO       To Whom It May Concern:    Tramaine Gomez was seen and treated in our department on 12/30/2019. She should not return to work until 1/06/19.

## (undated) NOTE — ED AVS SNAPSHOT
Jorge Abrii Angelito   MRN: [id number]    Department:  [facility] in [place]   Date of Visit:  12/6/2017           Disclosure     Insurance plans vary and the physician(s) referred by the ER may not be covered by your plan.  Please contact tell this physician (or your personal doctor if your instructions are to return to your personal doctor) about any new or lasting problems. The primary care or specialist physician will see patients referred from the [facility] Emergency Department.  Francis Merrill